# Patient Record
Sex: MALE | Race: OTHER | URBAN - METROPOLITAN AREA
[De-identification: names, ages, dates, MRNs, and addresses within clinical notes are randomized per-mention and may not be internally consistent; named-entity substitution may affect disease eponyms.]

---

## 2017-05-25 ENCOUNTER — OUTPATIENT (OUTPATIENT)
Dept: OUTPATIENT SERVICES | Facility: HOSPITAL | Age: 22
LOS: 1 days | Discharge: HOME | End: 2017-05-25

## 2017-06-28 DIAGNOSIS — R22.2 LOCALIZED SWELLING, MASS AND LUMP, TRUNK: ICD-10-CM

## 2021-03-11 ENCOUNTER — EMERGENCY (EMERGENCY)
Facility: HOSPITAL | Age: 26
LOS: 0 days | Discharge: HOME | End: 2021-03-11
Attending: EMERGENCY MEDICINE | Admitting: EMERGENCY MEDICINE
Payer: COMMERCIAL

## 2021-03-11 VITALS
SYSTOLIC BLOOD PRESSURE: 142 MMHG | WEIGHT: 225.09 LBS | TEMPERATURE: 99 F | OXYGEN SATURATION: 100 % | HEART RATE: 94 BPM | DIASTOLIC BLOOD PRESSURE: 82 MMHG | RESPIRATION RATE: 17 BRPM
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
## 2021-03-11 DIAGNOSIS — S80.812A ABRASION, LEFT LOWER LEG, INITIAL ENCOUNTER: ICD-10-CM
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
## 2021-03-11 PROCEDURE — 99282 EMERGENCY DEPT VISIT SF MDM: CPT

## 2021-03-11 NOTE — ED PROVIDER NOTE - CARE PLAN
Principal Discharge DX:	Dog scratch   Principal Discharge DX:	Dog scratch  Assessment and plan of treatment:	Plan: Wound care, reassess.

## 2021-03-11 NOTE — ED PROVIDER NOTE - OBJECTIVE STATEMENT
23m no pmh p/w scratches to LLE from new puppy, obtained from breeder in [place]. puppy was playing and scratched him leg 3 times, then licked wound. pt immediately wiped down wounds with alcohol pads. no fever/chills,induration, discharge, bleeding.

## 2021-03-11 NOTE — ED PROVIDER NOTE - PROGRESS NOTE DETAILS
ED Attending ALEX Rivero  Extensive conversation had with pt regarding wound care, cleanses, bacitracin application, given bacitracin to go home with, utd on tetanus, pt has puppy at home, aware of observation m is going to vet aware of signs and symptoms to return for, will follow up .

## 2021-03-11 NOTE — ED PROVIDER NOTE - CLINICAL SUMMARY MEDICAL DECISION MAKING FREE TEXT BOX
pt presented for abrasion to E, no signs of infection, aware of proper wound care, utd on tetanus, will observe puppy and have puppy follow up with vet, aware of signs and symptoms to return for, will follow up.

## 2021-03-11 NOTE — ED PROVIDER NOTE - PHYSICAL EXAMINATION
Constitutional: Well developed, well nourished. NAD. Good general hygiene  Extremities: No obvious deformity of LLE.   Skin: 3 linear scratches to left shin/calf. no induration, tenderness, purulent discharge.

## 2021-03-11 NOTE — ED PROVIDER NOTE - NSFOLLOWUPINSTRUCTIONS_ED_ALL_ED_FT
Please follow up with your pmd in 1-2 days.   Abrasion     An abrasion is a cut or a scrape on the outer surface of the skin. An abrasion does not go through all the layers of the skin. It is important to care for your abrasion properly to prevent infection.    What are the causes?  This condition is caused by falling on or gliding across the ground or another surface. When your skin rubs on something, the outer and inner layers of skin may rub off.    What are the signs or symptoms?  The main symptom of this condition is a cut or a scrape. The scrape may be bleeding, or it may appear red or pink. If the abrasion was caused by a fall, there may be a bruise under the cut or scrape.    How is this diagnosed?  An abrasion is diagnosed with a physical exam.    How is this treated?  Treatment for this condition depends on how large and deep the abrasion is. In most cases:  Your abrasion will be cleaned with water and mild soap. This is done to remove any dirt or debris (such as particles of glass or rock) that may be stuck in the wound.  An antibiotic ointment may be applied to the abrasion to help prevent infection.  A bandage (dressing) may be placed on the abrasion to keep it clean.  You may also need a tetanus shot.    Follow these instructions at home:  Medicines     Take or apply over-the-counter and prescription medicines only as told by your health care provider.  If you were prescribed an antibiotic medicine, apply it as told by your health care provider.  Wound care     Clean the wound 2–3 times a day, or as directed by your health care provider. To do this, wash the wound with mild soap and water, rinse off the soap, and pat the wound dry with a clean towel. Do not rub the wound.  Keep the dressing clean and dry as told by your health care provider.  There are many different ways to close and cover a wound. Follow instructions from your health care provider about:  Caring for your wound.  Changing and removing your dressing. You may have to change your dressing one or more times a day, or as directed by your health care provider.  Check your wound every day for signs of infection. Check for:  Redness, particularly a red streak that spreads out from the wound.  Swelling or increased pain.  Warmth.  Fluid, pus, or a bad smell.  If directed, put ice on the injured area to reduce pain and swelling:  Put ice in a plastic bag.   Place a towel between your skin and the bag.   Leave the ice on for 20 minutes, 2–3 times a day.  General instructions     Do not take baths, swim, or use a hot tub until your health care provider says it is okay to do so.  If possible, raise (elevate) the injured area above the level of your heart while you are sitting or lying down. This will reduce pain and swelling.  Keep all follow-up visits as directed by your health care provider. This is important.  Contact a health care provider if:  You received a tetanus shot, and you have swelling, severe pain, redness, or bleeding at the injection site.  Your pain is not controlled with medicine.  You have redness, swelling, or more pain at the site of your wound.  Get help right away if:  You have a red streak spreading away from your wound.  You have a fever.  You have fluid, blood, or pus coming from your wound.  You notice a bad smell coming from your wound or your dressing.  Summary  An abrasion is a cut or a scrape on the outer surface of the skin. An abrasion does not go through all the layers of the skin.  Care for your abrasion properly to prevent infection.  Clean the wound with mild soap and water 2–3 times a day. Follow instructions from your health care provider about taking medicines and changing your bandage (dressing).  Contact your health care provider if you have redness, swelling or more pain in the wound area.  Get help right away if you have a fever or if you have fluid, blood, pus, a bad smell, or a red streak coming from the wound.  This information is not intended to replace advice given to you by your health care provider. Make sure you discuss any questions you have with your health care provider.

## 2021-03-11 NOTE — ED PROVIDER NOTE - NS ED ROS FT
General: No fever, chills, or weakness.  Skin: 3 scratches to left shin/calf. no induration, discharge, bleeding.

## 2021-03-11 NOTE — ED PROVIDER NOTE - ATTENDING CONTRIBUTION TO CARE
24 y/o m w/ on pmhx, utd on tetanus vaccination, presents with superficial scratch to l leg from puppy he has at home, obtained puppy ~ 8 weeks old from breeder in [place], reports puppy is utd on vaccinations, but not sure of rabies, and wanted to come to ed to see if he needed rabies vaccine. puppy lives with pt, no unusual behavior and is going to vet tomorrow. pt cleaned wound immediately yesterday when event happened yesterday night at 10 pm. No fever, chills, n/v, cp,  pleuritic cp, sob, palpitations, diaphoresis, cough, ha/lh/dizziness, numbness/tingling, neck pain/ stiffness, abd pain, diarrhea, constipation, melena/brbpr, urinary symptoms, trauma, weakness, erythema, ecchymoses, bleeding, purulent discharge, edema, calf pain/swelling/erythema, sick contacts, recent travel or rash.    Vital Signs: I have reviewed the initial vital signs. Constitutional: WDWN in nad. Sitting on stretcher speaking full sentences. Integumentary: No rash. L lateral LLE with 3 linear abrasions, no surrounding erythema, No active drainage, fluctuance, no induration, no odor, no bleeding, no discharge, no pain to palpation. ENT: MMM NECK: Supple, non-tender, no meningeal signs. Cardiovascular: RRR, radial pulses 2/4 b/l. No JVD. Respiratory: BS present b/l, ctabl, no wheezing or crackles, no accessory muscle use, no stridor. Gastrointestinal: BS present throughout all 4 quadrants, soft, nd, nt, no rebound tenderness or guarding, no cvat. Musculoskeletal: FROM, no edema, no calf pain/swelling/erythema. Neurologic: AAOx3, motor 5/5 and sensation intact throughout upper and lower ext, CN II-XII intact, No facial droop or slurring of speech. No focal deficits.

## 2021-03-11 NOTE — ED PROVIDER NOTE - PATIENT PORTAL LINK FT
You can access the FollowMyHealth Patient Portal offered by [facility] by registering at the following website: [email or web address]. By joining [employer]’s FollowMyHealth portal, you will also be able to view your health information using other applications (apps) compatible with our system.

## 2021-03-11 NOTE — ED PROVIDER NOTE - NSFOLLOWUPCLINICS_GEN_ALL_ED_FT
[facility]  Medicine  [address], [place]   Phone: [phone]  Fax:   Follow Up Time: 1-3 Days

## 2024-03-03 ENCOUNTER — EMERGENCY (EMERGENCY)
Facility: HOSPITAL | Age: 29
LOS: 0 days | Discharge: HOME | End: 2024-03-03
Attending: EMERGENCY MEDICINE
Payer: MEDICAID

## 2024-03-03 VITALS
HEART RATE: 83 BPM | DIASTOLIC BLOOD PRESSURE: 72 MMHG | OXYGEN SATURATION: 97 % | RESPIRATION RATE: 18 BRPM | SYSTOLIC BLOOD PRESSURE: 125 MMHG

## 2024-03-03 VITALS
HEART RATE: 99 BPM | SYSTOLIC BLOOD PRESSURE: 131 MMHG | WEIGHT: 205.03 LBS | DIASTOLIC BLOOD PRESSURE: 77 MMHG | TEMPERATURE: 102 F | RESPIRATION RATE: 20 BRPM | OXYGEN SATURATION: 97 %

## 2024-03-03 DIAGNOSIS — Z20.822 CONTACT WITH AND (SUSPECTED) EXPOSURE TO COVID-19: ICD-10-CM

## 2024-03-03 DIAGNOSIS — J03.90 ACUTE TONSILLITIS, UNSPECIFIED: ICD-10-CM

## 2024-03-03 DIAGNOSIS — R50.9 FEVER, UNSPECIFIED: ICD-10-CM

## 2024-03-03 DIAGNOSIS — J02.9 ACUTE PHARYNGITIS, UNSPECIFIED: ICD-10-CM

## 2024-03-03 PROBLEM — Z78.9 OTHER SPECIFIED HEALTH STATUS: Chronic | Status: ACTIVE | Noted: 2021-03-11

## 2024-03-03 LAB
FLUAV AG NPH QL: SIGNIFICANT CHANGE UP
FLUBV AG NPH QL: SIGNIFICANT CHANGE UP
RSV RNA NPH QL NAA+NON-PROBE: SIGNIFICANT CHANGE UP
SARS-COV-2 RNA SPEC QL NAA+PROBE: SIGNIFICANT CHANGE UP

## 2024-03-03 PROCEDURE — 99283 EMERGENCY DEPT VISIT LOW MDM: CPT

## 2024-03-03 PROCEDURE — 99284 EMERGENCY DEPT VISIT MOD MDM: CPT

## 2024-03-03 PROCEDURE — 0241U: CPT

## 2024-03-03 RX ORDER — IBUPROFEN 200 MG
600 TABLET ORAL ONCE
Refills: 0 | Status: COMPLETED | OUTPATIENT
Start: 2024-03-03 | End: 2024-03-03

## 2024-03-03 RX ORDER — DEXAMETHASONE 0.5 MG/5ML
12 ELIXIR ORAL ONCE
Refills: 0 | Status: COMPLETED | OUTPATIENT
Start: 2024-03-03 | End: 2024-03-03

## 2024-03-03 RX ADMIN — Medication 12 MILLIGRAM(S): at 01:05

## 2024-03-03 RX ADMIN — Medication 600 MILLIGRAM(S): at 01:05

## 2024-03-03 RX ADMIN — Medication 300 MILLIGRAM(S): at 01:05

## 2024-03-03 NOTE — ED PROVIDER NOTE - PHYSICAL EXAMINATION
VITAL SIGNS: I have reviewed nursing notes and confirm.  CONSTITUTIONAL: Well-developed; well-nourished; in no acute distress.  SKIN: Skin exam is warm and dry, no acute rash.  HEAD: Normocephalic; atraumatic.  EYES: PERRL, EOM intact; conjunctiva and sclera clear.  ENT: No nasal discharge; airway clear. Throat +tonsillar exudates, uvula midline  NECK: Supple; non tender.  +cervical lymphadenopathy.  CARD: S1, S2 normal; no murmurs, gallops, or rubs. Regular rate and rhythm.  RESP: No wheezes, rales or rhonchi.  ABD: Normal bowel sounds; soft; non-distended; non-tender; no hepatosplenomegaly.  EXT: Normal ROM. No clubbing, cyanosis or edema.  NEURO: Alert, oriented. Grossly unremarkable. No focal deficits.

## 2024-03-03 NOTE — ED PROVIDER NOTE - PATIENT PORTAL LINK FT
You can access the FollowMyHealth Patient Portal offered by [facility] by registering at the following website: [email or web address]. By joining [employer]’s FollowMyHealth portal, you will also be able to view your health information using other applications (apps) compatible with our system.

## 2024-03-03 NOTE — ED PROVIDER NOTE - CLINICAL SUMMARY MEDICAL DECISION MAKING FREE TEXT BOX
28-year-old male, presents to the ED with sore throat and fever for 5 days.  No respiratory distress.  Exam shows exudative tonsillitis, lungs clear.  Given Decadron, ibuprofen and clindamycin.  Will DC to follow-up with PCP. 28-year-old male, presents to the ED with sore throat and fever for 5 days.  No respiratory distress.  Exam shows exudative tonsillitis, lungs clear.  Nasal swab negative.  Given Decadron, ibuprofen and clindamycin.  Will DC to follow-up with PCP.

## 2024-03-03 NOTE — ED PROVIDER NOTE - OBJECTIVE STATEMENT
28-year-old male, no past medical history, presents with fever and sore throat for 5 days.  Self medicated with amoxicillin without relief.  No drooling or shortness of breath.